# Patient Record
Sex: MALE | Race: WHITE | Employment: OTHER | URBAN - METROPOLITAN AREA
[De-identification: names, ages, dates, MRNs, and addresses within clinical notes are randomized per-mention and may not be internally consistent; named-entity substitution may affect disease eponyms.]

---

## 2019-06-26 ENCOUNTER — HOSPITAL ENCOUNTER (OUTPATIENT)
Dept: PHYSICAL THERAPY | Age: 44
Setting detail: THERAPIES SERIES
Discharge: HOME | End: 2019-06-26
Payer: MEDICAID

## 2020-06-24 ENCOUNTER — OFFICE VISIT (OUTPATIENT)
Dept: PRIMARY CARE CLINIC | Age: 45
End: 2020-06-24
Payer: MEDICAID

## 2020-06-24 PROCEDURE — 99211 OFF/OP EST MAY X REQ PHY/QHP: CPT | Performed by: NURSE PRACTITIONER

## 2020-06-24 RX ORDER — DIPHENHYDRAMINE HCL 25 MG
25 TABLET ORAL EVERY 6 HOURS PRN
COMMUNITY

## 2020-06-24 RX ORDER — CLONAZEPAM 1 MG/1
2 TABLET ORAL 2 TIMES DAILY
COMMUNITY

## 2020-06-24 RX ORDER — OXYCODONE HYDROCHLORIDE 15 MG/1
15 TABLET ORAL EVERY 8 HOURS
COMMUNITY

## 2020-06-24 RX ORDER — TIZANIDINE 4 MG/1
4 TABLET ORAL 4 TIMES DAILY
COMMUNITY

## 2020-06-24 RX ORDER — PREGABALIN 150 MG/1
150 CAPSULE ORAL 3 TIMES DAILY
COMMUNITY

## 2020-06-24 SDOH — HEALTH STABILITY: MENTAL HEALTH: HOW OFTEN DO YOU HAVE A DRINK CONTAINING ALCOHOL?: NEVER

## 2020-06-24 NOTE — PROGRESS NOTES
Obstructive Sleep Apnea (MAURI) Screening     Patient:  Lance Bello    YOB: 1975      Medical Record #:  [id number]                     Date:  6/24/2020     1. Are you a loud and/or regular snorer? []  Yes       [x] No    2. Have you been observed to gasp or stop breathing during sleep? []  Yes       [x] No    3. Do you feel tired or groggy upon awakening or do you awaken with a headache?           []  Yes       [x] No    4. Are you often tired or fatigued during the wake time hours? []  Yes       [x] No    5. Do you fall asleep sitting, reading, watching TV or driving? []  Yes       [x] No    6. Do you often have problems with memory or concentration? []  Yes       [x] No    **If patient's score is ? 3 they are considered high risk for MAURI. An Anesthesia provider will evaluate the patient and develop a plan of care the day of surgery. Note:  If the patient's BMI is more than 35 kg m¯² , has neck circumference > 40 cm, and/or high blood pressure the risk is greater (© American Sleep Apnea Association, 2006).

## 2020-06-27 LAB
SARS-COV-2: NOT DETECTED
SOURCE: NORMAL

## 2020-06-29 ENCOUNTER — ANESTHESIA EVENT (OUTPATIENT)
Dept: OPERATING ROOM | Age: 45
End: 2020-06-29
Payer: COMMERCIAL

## 2020-06-29 NOTE — ANESTHESIA PRE PROCEDURE
Department of Anesthesiology  Preprocedure Note       Name:  Johnie Lesch   Age:  41 y.o.  :  1975                                          MRN:  [id number]         Date:  2020      Surgeon: Trudy Ty): Coty Schumacher MD    Procedure: Procedure(s):  REPLACEMENT INTRATHECAL PUMP         FLOWONIX    Medications prior to admission:   Prior to Admission medications    Medication Sig Start Date End Date Taking? Authorizing Provider   pregabalin (LYRICA) 150 MG capsule Take 150 mg by mouth 3 times daily. Yes Historical Provider, MD   oxyCODONE (OXY-IR) 15 MG immediate release tablet Take 15 mg by mouth every 8 hours. Yes Historical Provider, MD   tiZANidine (ZANAFLEX) 4 MG tablet Take 4 mg by mouth 4 times daily TAKES 8 MG TWICE DAILY   Yes Historical Provider, MD   clonazePAM (KLONOPIN) 1 MG tablet Take 2 mg by mouth 2 times daily. Yes Historical Provider, MD   diphenhydrAMINE (BENADRYL) 25 MG tablet Take 25 mg by mouth every 6 hours as needed for Itching   Yes Historical Provider, MD       Current medications:    No current facility-administered medications for this encounter. Current Outpatient Medications   Medication Sig Dispense Refill    pregabalin (LYRICA) 150 MG capsule Take 150 mg by mouth 3 times daily.  oxyCODONE (OXY-IR) 15 MG immediate release tablet Take 15 mg by mouth every 8 hours.  tiZANidine (ZANAFLEX) 4 MG tablet Take 4 mg by mouth 4 times daily TAKES 8 MG TWICE DAILY      clonazePAM (KLONOPIN) 1 MG tablet Take 2 mg by mouth 2 times daily.  diphenhydrAMINE (BENADRYL) 25 MG tablet Take 25 mg by mouth every 6 hours as needed for Itching         Allergies: Allergies   Allergen Reactions    Augmentin [Amoxicillin-Pot Clavulanate] Rash       Problem List:  There is no problem list on file for this patient.       Past Medical History:        Diagnosis Date    Disc disorder 57       Past Surgical History:        Procedure Laterality Date    BACK SURGERY MULTPLE    BACK SURGERY      PAIN PUMP    ELBOW SURGERY Left     ULNAR NERVE TRANSPOSITION    ENDOSCOPY, COLON, DIAGNOSTIC      EYE SURGERY      LASIK    SPINAL CORD STIMULATOR SURGERY      REMOVED    SPINE SURGERY      HARDWARE AND SCAR TISSUE REMOVAL    TONSILLECTOMY      WISDOM TOOTH EXTRACTION         Social History:    Social History     Tobacco Use    Smoking status: Current Some Day Smoker     Types: Cigars    Smokeless tobacco: Current User     Types: Snuff   Substance Use Topics    Alcohol use: Never     Frequency: Never                                Ready to quit: Not Answered  Counseling given: Not Answered      Vital Signs (Current):   Vitals:    06/24/20 1618   Weight: 180 lb (81.6 kg)   Height: 5' 10\" (1.778 m)                                              BP Readings from Last 3 Encounters:   No data found for BP       NPO Status:                                                                                 BMI:   Wt Readings from Last 3 Encounters:   No data found for Wt     Body mass index is 25.83 kg/m². CBC:   Lab Results   Component Value Date    HGB 16.0 07/17/2013    HCT 48.4 07/17/2013       CMP:   Lab Results   Component Value Date    PROT 7.5 07/17/2013    BILITOT 0.40 07/17/2013    ALKPHOS 76 07/17/2013    AST 23 07/17/2013    ALT 19 07/17/2013       POC Tests: No results for input(s): POCGLU, POCNA, POCK, POCCL, POCBUN, POCHEMO, POCHCT in the last 72 hours.     Coags: No results found for: PROTIME, INR, APTT    HCG (If Applicable): No results found for: PREGTESTUR, PREGSERUM, HCG, HCGQUANT     ABGs: No results found for: PHART, PO2ART, RVX1FUS, OYT1HZZ, BEART, N5REJSST     Type & Screen (If Applicable):  No results found for: LABABO, LABRH    Drug/Infectious Status (If Applicable):  No results found for: HIV, HEPCAB    COVID-19 Screening (If Applicable):   Lab Results   Component Value Date    COVID19 Not Detected 06/24/2020         Anesthesia Evaluation  Patient summary reviewed and Nursing notes reviewed no history of anesthetic complications:   Airway: Mallampati: II     Neck ROM: full   Dental:          Pulmonary:Negative Pulmonary ROS and normal exam                               Cardiovascular:Negative CV ROS                      Neuro/Psych:   Negative Neuro/Psych ROS              GI/Hepatic/Renal: Neg GI/Hepatic/Renal ROS       (-) hiatal hernia and GERD       Endo/Other: Negative Endo/Other ROS                    Abdominal:           Vascular:                                        Anesthesia Plan      general     ASA 2     (I discussed with the patient the risks and benefits of PIV, general anesthesia, IV Narcotics, PACU. All questions were answered the patient agrees with the plan and wishes to proceed.  )  Induction: intravenous. Pre-Operative Diagnosis: Shunt malfunction, initial encounter [T85.618A]    41 y.o.   BMI:  Body mass index is 25.83 kg/m².      Vitals:    06/24/20 1618   Weight: 180 lb (81.6 kg)   Height: 5' 10\" (1.778 m)       Allergies   Allergen Reactions    Augmentin [Amoxicillin-Pot Clavulanate] Rash       Social History     Tobacco Use    Smoking status: Current Some Day Smoker     Types: Cigars    Smokeless tobacco: Current User     Types: Snuff   Substance Use Topics    Alcohol use: Never     Frequency: Never       LABS:    CBC  Lab Results   Component Value Date/Time    HGB 16.0 07/17/2013 08:53 AM    HCT 48.4 07/17/2013 08:53 AM     RENAL  No results found for: NA, K, CL, CO2, BUN, CREATININE, GLUCOSE  COAGS  No results found for: PROTIME, INR, APTT    Mellisa Nugent MD   6/29/2020

## 2020-06-30 ENCOUNTER — HOSPITAL ENCOUNTER (OUTPATIENT)
Dept: GENERAL RADIOLOGY | Age: 45
Discharge: HOME | End: 2020-06-30
Attending: NEUROLOGICAL SURGERY
Payer: COMMERCIAL

## 2020-06-30 ENCOUNTER — HOSPITAL ENCOUNTER (OUTPATIENT)
Age: 45
Setting detail: OUTPATIENT SURGERY
Discharge: HOME | End: 2020-06-30
Attending: NEUROLOGICAL SURGERY | Admitting: NEUROLOGICAL SURGERY
Payer: COMMERCIAL

## 2020-06-30 ENCOUNTER — ANESTHESIA (OUTPATIENT)
Dept: OPERATING ROOM | Age: 45
End: 2020-06-30
Payer: COMMERCIAL

## 2020-06-30 VITALS
SYSTOLIC BLOOD PRESSURE: 82 MMHG | OXYGEN SATURATION: 100 % | DIASTOLIC BLOOD PRESSURE: 56 MMHG | TEMPERATURE: 96.8 F | RESPIRATION RATE: 7 BRPM

## 2020-06-30 VITALS
BODY MASS INDEX: 25.48 KG/M2 | RESPIRATION RATE: 12 BRPM | WEIGHT: 178 LBS | HEART RATE: 65 BPM | SYSTOLIC BLOOD PRESSURE: 135 MMHG | OXYGEN SATURATION: 97 % | DIASTOLIC BLOOD PRESSURE: 74 MMHG | TEMPERATURE: 96.9 F | HEIGHT: 70 IN

## 2020-06-30 PROCEDURE — 3700000000 HC ANESTHESIA ATTENDED CARE: Performed by: NEUROLOGICAL SURGERY

## 2020-06-30 PROCEDURE — 2709999900 HC NON-CHARGEABLE SUPPLY: Performed by: NEUROLOGICAL SURGERY

## 2020-06-30 PROCEDURE — E0783 PROGRAMMABLE INFUSION PUMP: HCPCS | Performed by: NEUROLOGICAL SURGERY

## 2020-06-30 PROCEDURE — 6360000002 HC RX W HCPCS: Performed by: NEUROLOGICAL SURGERY

## 2020-06-30 PROCEDURE — 6360000002 HC RX W HCPCS: Performed by: NURSE ANESTHETIST, CERTIFIED REGISTERED

## 2020-06-30 PROCEDURE — 2500000003 HC RX 250 WO HCPCS: Performed by: NURSE ANESTHETIST, CERTIFIED REGISTERED

## 2020-06-30 PROCEDURE — 2500000003 HC RX 250 WO HCPCS: Performed by: NEUROLOGICAL SURGERY

## 2020-06-30 PROCEDURE — 2580000003 HC RX 258: Performed by: ANESTHESIOLOGY

## 2020-06-30 PROCEDURE — 7100000000 HC PACU RECOVERY - FIRST 15 MIN: Performed by: NEUROLOGICAL SURGERY

## 2020-06-30 PROCEDURE — 2580000003 HC RX 258: Performed by: NEUROLOGICAL SURGERY

## 2020-06-30 PROCEDURE — 7100000010 HC PHASE II RECOVERY - FIRST 15 MIN: Performed by: NEUROLOGICAL SURGERY

## 2020-06-30 PROCEDURE — 3700000001 HC ADD 15 MINUTES (ANESTHESIA): Performed by: NEUROLOGICAL SURGERY

## 2020-06-30 PROCEDURE — 2720000010 HC SURG SUPPLY STERILE: Performed by: NEUROLOGICAL SURGERY

## 2020-06-30 PROCEDURE — 3600000004 HC SURGERY LEVEL 4 BASE: Performed by: NEUROLOGICAL SURGERY

## 2020-06-30 PROCEDURE — 3600000014 HC SURGERY LEVEL 4 ADDTL 15MIN: Performed by: NEUROLOGICAL SURGERY

## 2020-06-30 PROCEDURE — 7100000001 HC PACU RECOVERY - ADDTL 15 MIN: Performed by: NEUROLOGICAL SURGERY

## 2020-06-30 PROCEDURE — 7100000011 HC PHASE II RECOVERY - ADDTL 15 MIN: Performed by: NEUROLOGICAL SURGERY

## 2020-06-30 DEVICE — THE PROMETRA II PROGRAMMABLE PUMP IS A STERILE, BATTERY-OPERATED, TEARDROP-SHAPED IMPLANTABLE, PROGRAMMABLE INFUSION PUMP, WITH A RIGID TITANIUM HOUSING AND FLOW CONTROLLER SYSTEM, WHICH DISPENSES INFUSATE INTO THE INTRATHECAL SPACE THROUGH AN IMPLANTED INTRATHECAL CATHETER. TO HELP INCREASE SAFETY, THE PROMETRA II PUMP INCORPORATES A SAFETY VALVE (FLOW-ACTIVATED VALVE OR FAV) THAT WILL SHUT OFF DRUG FLOW TO THE PATIENT IN THE EVENT A HIGH FLOW RATE OCCURS, SUCH AS DURING AN MRI. ALL FUNCTIONS OF THE SYSTEM (E.G., DOSING) ARE CONTROLLED EXTERNALLY USING A HAND-HELD, BATTERY-OPERATED PROGRAMMER. THE PROMETRA II PUMP CONTAINS A METAL BELLOWS DRUG RESERVOIR WITH A CAPACITY OF 20 MILLILITERS (ML). THE RESERVOIR PROPELLANT IS STORED WITHIN THE RIGID HOUSING SURROUNDING THE BELLOWS AND PROVIDES THE DRIVING PRESSURE FOR THE PUMP. THE DRIVING PRESSURE ON THE RESERVOIR FORCES THE INFUSATE THROUGH AN OUTLET FILTER (0.22 PM), AND INTO AN ELECTRONICALLY CONTROLLED FLOW METERING VALVE-ACCUMULATOR SUBSYSTEM. THE INFUMORPH PASSES FROM THE FLOW METERING SUBSYSTEM, INTO THE CATHETER ACCESS PORT THEN INTO THE CATHETER FOR DELIVERY TO THE INTRATHECAL SPACE.
Type: IMPLANTABLE DEVICE | Status: FUNCTIONAL
Brand: PROMETRA II PUMP

## 2020-06-30 DEVICE — THE CATHETER REVISION KIT IS A STERILE KIT DESIGNED TO FACILITATE THE REPLACEMENT OF A SECTION OF THE INTRATHECAL CATHETER.  A REPLACEMENT CATHETER SEGMENT IS PROVIDED WITH A CATHETER CONNECTOR (AND SLEEVE) TO ALLOW THIS SEGMENT TO BE ATTACHED TO THE EXISTING CATHETER.
Type: IMPLANTABLE DEVICE | Status: FUNCTIONAL
Brand: PROMETRA CATHETER REVISION KIT

## 2020-06-30 RX ORDER — MIDAZOLAM HYDROCHLORIDE 1 MG/ML
INJECTION INTRAMUSCULAR; INTRAVENOUS PRN
Status: DISCONTINUED | OUTPATIENT
Start: 2020-06-30 | End: 2020-06-30 | Stop reason: SDUPTHER

## 2020-06-30 RX ORDER — DIPHENHYDRAMINE HYDROCHLORIDE 50 MG/ML
12.5 INJECTION INTRAMUSCULAR; INTRAVENOUS
Status: DISCONTINUED | OUTPATIENT
Start: 2020-06-30 | End: 2020-06-30 | Stop reason: HOSPADM

## 2020-06-30 RX ORDER — SODIUM CHLORIDE, SODIUM LACTATE, POTASSIUM CHLORIDE, CALCIUM CHLORIDE 600; 310; 30; 20 MG/100ML; MG/100ML; MG/100ML; MG/100ML
INJECTION, SOLUTION INTRAVENOUS CONTINUOUS
Status: DISCONTINUED | OUTPATIENT
Start: 2020-06-30 | End: 2020-06-30 | Stop reason: HOSPADM

## 2020-06-30 RX ORDER — PROPOFOL 10 MG/ML
INJECTION, EMULSION INTRAVENOUS PRN
Status: DISCONTINUED | OUTPATIENT
Start: 2020-06-30 | End: 2020-06-30 | Stop reason: SDUPTHER

## 2020-06-30 RX ORDER — VANCOMYCIN HYDROCHLORIDE 1 G/20ML
INJECTION, POWDER, LYOPHILIZED, FOR SOLUTION INTRAVENOUS PRN
Status: DISCONTINUED | OUTPATIENT
Start: 2020-06-30 | End: 2020-06-30 | Stop reason: ALTCHOICE

## 2020-06-30 RX ORDER — ONDANSETRON 2 MG/ML
4 INJECTION INTRAMUSCULAR; INTRAVENOUS PRN
Status: DISCONTINUED | OUTPATIENT
Start: 2020-06-30 | End: 2020-06-30 | Stop reason: HOSPADM

## 2020-06-30 RX ORDER — ROCURONIUM BROMIDE 10 MG/ML
INJECTION, SOLUTION INTRAVENOUS PRN
Status: DISCONTINUED | OUTPATIENT
Start: 2020-06-30 | End: 2020-06-30 | Stop reason: SDUPTHER

## 2020-06-30 RX ORDER — LIDOCAINE HYDROCHLORIDE 10 MG/ML
2 INJECTION, SOLUTION INFILTRATION; PERINEURAL
Status: DISCONTINUED | OUTPATIENT
Start: 2020-06-30 | End: 2020-06-30 | Stop reason: HOSPADM

## 2020-06-30 RX ORDER — MORPHINE SULFATE 2 MG/ML
2 INJECTION, SOLUTION INTRAMUSCULAR; INTRAVENOUS EVERY 5 MIN PRN
Status: DISCONTINUED | OUTPATIENT
Start: 2020-06-30 | End: 2020-06-30 | Stop reason: HOSPADM

## 2020-06-30 RX ORDER — OXYCODONE HYDROCHLORIDE AND ACETAMINOPHEN 5; 325 MG/1; MG/1
1 TABLET ORAL PRN
Status: DISCONTINUED | OUTPATIENT
Start: 2020-06-30 | End: 2020-06-30 | Stop reason: HOSPADM

## 2020-06-30 RX ORDER — MORPHINE SULFATE 2 MG/ML
1 INJECTION, SOLUTION INTRAMUSCULAR; INTRAVENOUS EVERY 5 MIN PRN
Status: DISCONTINUED | OUTPATIENT
Start: 2020-06-30 | End: 2020-06-30 | Stop reason: HOSPADM

## 2020-06-30 RX ORDER — OXYCODONE HYDROCHLORIDE AND ACETAMINOPHEN 5; 325 MG/1; MG/1
2 TABLET ORAL PRN
Status: DISCONTINUED | OUTPATIENT
Start: 2020-06-30 | End: 2020-06-30 | Stop reason: HOSPADM

## 2020-06-30 RX ORDER — PROMETHAZINE HYDROCHLORIDE 25 MG/ML
6.25 INJECTION, SOLUTION INTRAMUSCULAR; INTRAVENOUS
Status: DISCONTINUED | OUTPATIENT
Start: 2020-06-30 | End: 2020-06-30 | Stop reason: HOSPADM

## 2020-06-30 RX ORDER — LIDOCAINE HYDROCHLORIDE AND EPINEPHRINE 10; 10 MG/ML; UG/ML
INJECTION, SOLUTION INFILTRATION; PERINEURAL PRN
Status: DISCONTINUED | OUTPATIENT
Start: 2020-06-30 | End: 2020-06-30 | Stop reason: ALTCHOICE

## 2020-06-30 RX ORDER — LIDOCAINE HYDROCHLORIDE 20 MG/ML
INJECTION, SOLUTION INFILTRATION; PERINEURAL PRN
Status: DISCONTINUED | OUTPATIENT
Start: 2020-06-30 | End: 2020-06-30 | Stop reason: SDUPTHER

## 2020-06-30 RX ORDER — ONDANSETRON 2 MG/ML
INJECTION INTRAMUSCULAR; INTRAVENOUS PRN
Status: DISCONTINUED | OUTPATIENT
Start: 2020-06-30 | End: 2020-06-30 | Stop reason: SDUPTHER

## 2020-06-30 RX ORDER — HYDRALAZINE HYDROCHLORIDE 20 MG/ML
5 INJECTION INTRAMUSCULAR; INTRAVENOUS EVERY 10 MIN PRN
Status: DISCONTINUED | OUTPATIENT
Start: 2020-06-30 | End: 2020-06-30 | Stop reason: HOSPADM

## 2020-06-30 RX ORDER — LABETALOL HYDROCHLORIDE 5 MG/ML
5 INJECTION, SOLUTION INTRAVENOUS EVERY 10 MIN PRN
Status: DISCONTINUED | OUTPATIENT
Start: 2020-06-30 | End: 2020-06-30 | Stop reason: HOSPADM

## 2020-06-30 RX ORDER — FENTANYL CITRATE 50 UG/ML
INJECTION, SOLUTION INTRAMUSCULAR; INTRAVENOUS PRN
Status: DISCONTINUED | OUTPATIENT
Start: 2020-06-30 | End: 2020-06-30 | Stop reason: SDUPTHER

## 2020-06-30 RX ORDER — BUPIVACAINE HYDROCHLORIDE 5 MG/ML
INJECTION, SOLUTION EPIDURAL; INTRACAUDAL PRN
Status: DISCONTINUED | OUTPATIENT
Start: 2020-06-30 | End: 2020-06-30 | Stop reason: ALTCHOICE

## 2020-06-30 RX ORDER — GLYCOPYRROLATE 0.2 MG/ML
INJECTION INTRAMUSCULAR; INTRAVENOUS PRN
Status: DISCONTINUED | OUTPATIENT
Start: 2020-06-30 | End: 2020-06-30 | Stop reason: SDUPTHER

## 2020-06-30 RX ORDER — DEXAMETHASONE SODIUM PHOSPHATE 4 MG/ML
INJECTION, SOLUTION INTRA-ARTICULAR; INTRALESIONAL; INTRAMUSCULAR; INTRAVENOUS; SOFT TISSUE PRN
Status: DISCONTINUED | OUTPATIENT
Start: 2020-06-30 | End: 2020-06-30 | Stop reason: SDUPTHER

## 2020-06-30 RX ADMIN — PROPOFOL 160 MG: 10 INJECTION, EMULSION INTRAVENOUS at 12:52

## 2020-06-30 RX ADMIN — SODIUM CHLORIDE, POTASSIUM CHLORIDE, SODIUM LACTATE AND CALCIUM CHLORIDE: 600; 310; 30; 20 INJECTION, SOLUTION INTRAVENOUS at 12:35

## 2020-06-30 RX ADMIN — DEXAMETHASONE SODIUM PHOSPHATE 10 MG: 4 INJECTION, SOLUTION INTRAMUSCULAR; INTRAVENOUS at 13:05

## 2020-06-30 RX ADMIN — SUGAMMADEX 200 MG: 100 INJECTION, SOLUTION INTRAVENOUS at 13:42

## 2020-06-30 RX ADMIN — LIDOCAINE HYDROCHLORIDE 80 MG: 20 INJECTION, SOLUTION INFILTRATION; PERINEURAL at 12:52

## 2020-06-30 RX ADMIN — GLYCOPYRROLATE 0.2 MG: 0.2 INJECTION, SOLUTION INTRAMUSCULAR; INTRAVENOUS at 13:22

## 2020-06-30 RX ADMIN — ROCURONIUM BROMIDE 50 MG: 10 SOLUTION INTRAVENOUS at 12:52

## 2020-06-30 RX ADMIN — FENTANYL CITRATE 100 MCG: 50 INJECTION INTRAMUSCULAR; INTRAVENOUS at 12:50

## 2020-06-30 RX ADMIN — ONDANSETRON 4 MG: 2 INJECTION INTRAMUSCULAR; INTRAVENOUS at 13:04

## 2020-06-30 RX ADMIN — MIDAZOLAM HYDROCHLORIDE 2 MG: 2 INJECTION, SOLUTION INTRAMUSCULAR; INTRAVENOUS at 12:48

## 2020-06-30 ASSESSMENT — PAIN DESCRIPTION - PROGRESSION: CLINICAL_PROGRESSION: OTHER (COMMENT)

## 2020-06-30 ASSESSMENT — PULMONARY FUNCTION TESTS
PIF_VALUE: 15
PIF_VALUE: 16
PIF_VALUE: 14
PIF_VALUE: 16
PIF_VALUE: 15
PIF_VALUE: 16
PIF_VALUE: 1
PIF_VALUE: 1
PIF_VALUE: 0
PIF_VALUE: 15
PIF_VALUE: 0
PIF_VALUE: 15
PIF_VALUE: 16
PIF_VALUE: 14
PIF_VALUE: 15
PIF_VALUE: 16
PIF_VALUE: 15
PIF_VALUE: 16
PIF_VALUE: 17
PIF_VALUE: 16
PIF_VALUE: 17
PIF_VALUE: 17
PIF_VALUE: 15
PIF_VALUE: 15
PIF_VALUE: 16
PIF_VALUE: 15
PIF_VALUE: 16
PIF_VALUE: 16
PIF_VALUE: 6
PIF_VALUE: 1
PIF_VALUE: 13
PIF_VALUE: 14
PIF_VALUE: 16
PIF_VALUE: 14
PIF_VALUE: 2
PIF_VALUE: 15
PIF_VALUE: 17
PIF_VALUE: 3
PIF_VALUE: 15
PIF_VALUE: 16
PIF_VALUE: 16
PIF_VALUE: 15
PIF_VALUE: 16
PIF_VALUE: 14
PIF_VALUE: 17
PIF_VALUE: 16
PIF_VALUE: 15
PIF_VALUE: 14
PIF_VALUE: 16
PIF_VALUE: 17
PIF_VALUE: 15
PIF_VALUE: 16
PIF_VALUE: 16
PIF_VALUE: 0
PIF_VALUE: 15
PIF_VALUE: 16
PIF_VALUE: 17
PIF_VALUE: 14

## 2020-06-30 ASSESSMENT — PAIN DESCRIPTION - FREQUENCY
FREQUENCY: CONTINUOUS
FREQUENCY: CONTINUOUS

## 2020-06-30 ASSESSMENT — PAIN DESCRIPTION - ORIENTATION
ORIENTATION: RIGHT;LEFT
ORIENTATION: RIGHT;LEFT

## 2020-06-30 ASSESSMENT — PAIN SCALES - GENERAL
PAINLEVEL_OUTOF10: 9
PAINLEVEL_OUTOF10: 8

## 2020-06-30 ASSESSMENT — PAIN DESCRIPTION - LOCATION
LOCATION: LEG
LOCATION: LEG
LOCATION: BACK;LEG;ARM;NECK

## 2020-06-30 ASSESSMENT — PAIN DESCRIPTION - PAIN TYPE
TYPE: OTHER (COMMENT)
TYPE: CHRONIC PAIN

## 2020-06-30 ASSESSMENT — PAIN DESCRIPTION - ONSET: ONSET: OTHER (COMMENT)

## 2020-06-30 ASSESSMENT — PAIN DESCRIPTION - DESCRIPTORS: DESCRIPTORS: ACHING

## 2020-06-30 NOTE — PROGRESS NOTES
Pt tolerating light snack and sips of water. Report given to derek rn. Pt states pain is 8 out of 10 and states that is tolerable.

## 2020-06-30 NOTE — ANESTHESIA POSTPROCEDURE EVALUATION
Department of Anesthesiology  Postprocedure Note    Patient: Enrico Solis  MRN: [id number]  YOB: 1975  Date of evaluation: 6/30/2020  Time:  6:13 PM     Procedure Summary     Date:  06/30/20 Room / Location:  [facility]    Anesthesia Start:  7188 Anesthesia Stop:  1400    Procedure:  REPLACEMENT INTRATHECAL PUMP         FLOWONIX (N/A ) Diagnosis:       Shunt malfunction, initial encounter      (BREAKDOWN IMPLANTS)    Surgeon: Syde Sanchez MD Responsible Provider:  Tony Keller MD    Anesthesia Type:  general ASA Status:  2          Anesthesia Type: general    Ed Phase I: Ed Score: 10    Ed Phase II:      Last vitals: Reviewed and per EMR flowsheets. Anesthesia Post Evaluation    Comments: Postoperative Anesthesia Note    Name:    Enrico Solis  MRN:      [id number]    Patient Vitals in the past 12 hrs:  06/30/20 1452, Pulse:65  06/30/20 1435, BP:135/74, Temp:96.9 °F (36.1 °C), Temp src:Temporal, Pulse:67, Resp:12, SpO2:97 %  06/30/20 1358, BP:119/79, Temp:97.8 °F (36.6 °C), Temp src:Temporal, Pulse:66, Resp:10, SpO2:97 %  06/30/20 1045, BP:(!) 144/80, Temp:98.4 °F (36.9 °C), Temp src:Temporal, Pulse:74, Resp:20, SpO2:95 %, Height:5' 10\" (1.778 m), Weight:178 lb (80.7 kg)     LABS:    CBC  Lab Results       Component                Value               Date/Time                  HGB                      16.0                07/17/2013 08:53 AM        HCT                      48.4                07/17/2013 08:53 AM   RENAL  No results found for: NA, K, CL, CO2, BUN, CREATININE, GLUCOSE  COAGS  No results found for: PROTIME, INR, APTT    Intake & Output:  @97JMWE@    Nausea & Vomiting:  No    Level of Consciousness:  Awake    Pain Assessment:  Adequate analgesia    Anesthesia Complications:  No apparent anesthetic complications    SUMMARY      Vital signs stable  OK to discharge from Stage I post anesthesia care.   Care transferred from Anesthesiology

## 2020-06-30 NOTE — BRIEF OP NOTE
Brief Postoperative Note      Patient: Alexx Mackenzie  YOB: 1975  MRN: [id number]    Date of Procedure: 6/30/2020    Pre-Op Diagnosis: BREAKDOWN IMPLANTS    Post-Op Diagnosis: Same       Procedure(s):  REPLACEMENT INTRATHECAL PUMP         FLOWONIX    Surgeon(s): Jeff Holt MD    Assistant:  Surgical Assistant: Anne Marie Arnold    Anesthesia: General    Estimated Blood Loss (mL): Minimal    Complications: None    Specimens:   * No specimens in log *    Implants:  Implant Name Type Inv.  Item Serial No.  Lot No. LRB No. Used Action   IMPL PUMP PAIN PROGRAM PROMETRA II - [id number] Spine IMPL PUMP PAIN PROGRAM Kika Johnston [id number] [facility] [id number] N/A 1 Implanted   KIT REVISION CATHETER Port KIT REVISION CATHETER  [facility] [id number] N/A 1 Implanted         Drains: * No LDAs found *    Findings: Patent catheter    Electronically signed by Christine Alanis MD on 6/30/2020 at 1:55 PM

## 2020-07-01 NOTE — OP NOTE
was slightly enlarged. Four Ethibond sutures  were placed through the four corners. The pump was then fixed to the  catheter and implanted back into the abdomen. The Ethibond sutures were  passed through the islets and tied, securing the pump firmly to the  anterior abdominal wall. Careful attention was made not to trap the  catheter within the sutures. We then aspirated the site port confirming  patency of the system again. The wound was then irrigated with  antibiotic saline. Vancomycin was placed in the incision, which was  then closed using deep 3-0 Vicryls, followed by running 4-0 Monocryl,  Prineo dressing, surgical dressing, followed by a binder. The patient  was returned to the supine position, extubated by the Anesthesia  service, and transferred to the PACU. His pump was set to run at 1500  mcg a day of fentanyl. All counts were correct and I was present for  the entirety of the procedure.         Lori Yu MD    D: 06/30/2020 14:00:48       T: 06/30/2020 23:20:47     [id number]  Job#: [id number]     Doc#: [id number]    CC:

## 2020-08-06 NOTE — H&P
Day of surgery I reviewed his H&P dated 6/26/2020. Patient examined and no changes noted. 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 Plan to proceed with intrathecal pump replacement. Surgery and consent reviewed with patient. No further questions.

## 2020-09-21 ENCOUNTER — HOSPITAL ENCOUNTER (OUTPATIENT)
Dept: GENERAL RADIOLOGY | Age: 45
Discharge: HOME | End: 2020-09-21
Payer: COMMERCIAL

## 2020-09-21 ENCOUNTER — HOSPITAL ENCOUNTER (OUTPATIENT)
Age: 45
Discharge: HOME | End: 2020-09-21
Payer: COMMERCIAL

## 2020-09-21 PROCEDURE — 71046 X-RAY EXAM CHEST 2 VIEWS: CPT

## 2021-04-27 ENCOUNTER — HOSPITAL ENCOUNTER (OUTPATIENT)
Dept: MRI IMAGING | Age: 46
Discharge: HOME | End: 2021-04-27
Payer: COMMERCIAL

## 2021-04-27 DIAGNOSIS — M25.512 LEFT SHOULDER PAIN, UNSPECIFIED CHRONICITY: ICD-10-CM

## 2021-04-27 PROCEDURE — 73221 MRI JOINT UPR EXTREM W/O DYE: CPT

## 2021-11-09 ENCOUNTER — HOSPITAL ENCOUNTER (OUTPATIENT)
Dept: MRI IMAGING | Age: 46
Discharge: HOME | End: 2021-11-09
Payer: COMMERCIAL

## 2021-11-09 DIAGNOSIS — M54.12 CERVICAL RADICULITIS: ICD-10-CM

## 2021-11-09 PROCEDURE — 72141 MRI NECK SPINE W/O DYE: CPT

## 2022-12-03 ENCOUNTER — APPOINTMENT (OUTPATIENT)
Dept: GENERAL RADIOLOGY | Age: 47
End: 2022-12-03
Payer: COMMERCIAL

## 2022-12-03 ENCOUNTER — HOSPITAL ENCOUNTER (EMERGENCY)
Age: 47
Discharge: HOME | End: 2022-12-03
Payer: COMMERCIAL

## 2022-12-03 VITALS
HEIGHT: 70 IN | SYSTOLIC BLOOD PRESSURE: 94 MMHG | DIASTOLIC BLOOD PRESSURE: 68 MMHG | WEIGHT: 185 LBS | HEART RATE: 94 BPM | TEMPERATURE: 98.3 F | OXYGEN SATURATION: 95 % | RESPIRATION RATE: 14 BRPM | BODY MASS INDEX: 26.48 KG/M2

## 2022-12-03 DIAGNOSIS — J06.9 ACUTE UPPER RESPIRATORY INFECTION: Primary | ICD-10-CM

## 2022-12-03 DIAGNOSIS — J10.1 INFLUENZA A: ICD-10-CM

## 2022-12-03 LAB
A/G RATIO: 1.5 (ref 1.1–2.2)
ALBUMIN SERPL-MCNC: 4 G/DL (ref 3.4–5)
ALP BLD-CCNC: 69 U/L (ref 40–129)
ALT SERPL-CCNC: 15 U/L (ref 10–40)
ANION GAP SERPL CALCULATED.3IONS-SCNC: 11 MMOL/L (ref 3–16)
AST SERPL-CCNC: 29 U/L (ref 15–37)
BASOPHILS ABSOLUTE: 0.1 K/UL (ref 0–0.2)
BASOPHILS RELATIVE PERCENT: 1.8 %
BILIRUB SERPL-MCNC: 0.6 MG/DL (ref 0–1)
BUN BLDV-MCNC: 10 MG/DL (ref 7–20)
CALCIUM SERPL-MCNC: 9.1 MG/DL (ref 8.3–10.6)
CHLORIDE BLD-SCNC: 94 MMOL/L (ref 99–110)
CO2: 27 MMOL/L (ref 21–32)
CREAT SERPL-MCNC: 0.8 MG/DL (ref 0.9–1.3)
EOSINOPHILS ABSOLUTE: 0.1 K/UL (ref 0–0.6)
EOSINOPHILS RELATIVE PERCENT: 1.1 %
GFR SERPL CREATININE-BSD FRML MDRD: >60 ML/MIN/{1.73_M2}
GLUCOSE BLD-MCNC: 75 MG/DL (ref 70–99)
HCT VFR BLD CALC: 46.5 % (ref 40.5–52.5)
HEMOGLOBIN: 15.6 G/DL (ref 13.5–17.5)
LACTIC ACID, SEPSIS: 1.6 MMOL/L (ref 0.4–1.9)
LYMPHOCYTES ABSOLUTE: 0.9 K/UL (ref 1–5.1)
LYMPHOCYTES RELATIVE PERCENT: 18 %
MCH RBC QN AUTO: 28.2 PG (ref 26–34)
MCHC RBC AUTO-ENTMCNC: 33.5 G/DL (ref 31–36)
MCV RBC AUTO: 84.2 FL (ref 80–100)
MONOCYTES ABSOLUTE: 0.3 K/UL (ref 0–1.3)
MONOCYTES RELATIVE PERCENT: 6.7 %
NEUTROPHILS ABSOLUTE: 3.7 K/UL (ref 1.7–7.7)
NEUTROPHILS RELATIVE PERCENT: 72.4 %
PDW BLD-RTO: 12.9 % (ref 12.4–15.4)
PLATELET # BLD: 117 K/UL (ref 135–450)
PMV BLD AUTO: 8.1 FL (ref 5–10.5)
POTASSIUM REFLEX MAGNESIUM: 4.4 MMOL/L (ref 3.5–5.1)
RAPID INFLUENZA  B AGN: NEGATIVE
RAPID INFLUENZA A AGN: POSITIVE
RBC # BLD: 5.52 M/UL (ref 4.2–5.9)
SARS-COV-2, NAAT: NOT DETECTED
SODIUM BLD-SCNC: 132 MMOL/L (ref 136–145)
TOTAL PROTEIN: 6.7 G/DL (ref 6.4–8.2)
WBC # BLD: 5.1 K/UL (ref 4–11)

## 2022-12-03 PROCEDURE — 71046 X-RAY EXAM CHEST 2 VIEWS: CPT

## 2022-12-03 PROCEDURE — 87804 INFLUENZA ASSAY W/OPTIC: CPT

## 2022-12-03 PROCEDURE — 80053 COMPREHEN METABOLIC PANEL: CPT

## 2022-12-03 PROCEDURE — 87635 SARS-COV-2 COVID-19 AMP PRB: CPT

## 2022-12-03 PROCEDURE — 85025 COMPLETE CBC W/AUTO DIFF WBC: CPT

## 2022-12-03 PROCEDURE — 99284 EMERGENCY DEPT VISIT MOD MDM: CPT

## 2022-12-03 PROCEDURE — 6360000002 HC RX W HCPCS: Performed by: PHYSICIAN ASSISTANT

## 2022-12-03 PROCEDURE — 36415 COLL VENOUS BLD VENIPUNCTURE: CPT

## 2022-12-03 PROCEDURE — 83605 ASSAY OF LACTIC ACID: CPT

## 2022-12-03 RX ORDER — DEXAMETHASONE 6 MG/1
12 TABLET ORAL ONCE
Qty: 2 TABLET | Refills: 0 | Status: SHIPPED | OUTPATIENT
Start: 2022-12-03 | End: 2022-12-03

## 2022-12-03 RX ORDER — DEXAMETHASONE 4 MG/1
12 TABLET ORAL ONCE
Status: COMPLETED | OUTPATIENT
Start: 2022-12-03 | End: 2022-12-03

## 2022-12-03 RX ADMIN — DEXAMETHASONE 12 MG: 4 TABLET ORAL at 17:01

## 2022-12-03 ASSESSMENT — LIFESTYLE VARIABLES
HOW OFTEN DO YOU HAVE A DRINK CONTAINING ALCOHOL: NEVER
HOW MANY STANDARD DRINKS CONTAINING ALCOHOL DO YOU HAVE ON A TYPICAL DAY: PATIENT DOES NOT DRINK

## 2022-12-03 ASSESSMENT — PAIN - FUNCTIONAL ASSESSMENT: PAIN_FUNCTIONAL_ASSESSMENT: NONE - DENIES PAIN

## 2022-12-03 NOTE — ED PROVIDER NOTES
[facility]        Pt Name: Risa Becerra  MRN: [id number]  [place] 1975  Date of evaluation: 12/3/2022  Provider: Efrain Leslie PA-C  PCP: Gisella Washington MD  Note Started: 4:03 PM EST 12/3/22          BABS. I have evaluated this patient. My supervising physician was available for consultation. CHIEF COMPLAINT       Chief Complaint   Patient presents with    Fever     Pt w c/o fever from Tuesday that was controlled by Tylenol. Current T 98.3 F       HISTORY OF PRESENT ILLNESS   (Location, Timing/Onset, Context/Setting, Quality, Duration, Modifying Factors, Severity, Associated Signs and Symptoms)  Note limiting factors. Chief Complaint: Cough, fever, general weakness    Risa Becerra is a 56 y.o. male who presents to the emergency department the chief complaint of a 4-day history of cough, fever, general weakness, body aches and chills. Patient's had fevers as high as 103 two days ago. He denies chest pain, shortness breath, abdominal pain, urinary or bowel symptoms or other symptoms. Denies any known recent sick contacts. States he has had his flu and COVID shots. Denies any other symptoms. Patient states he does smoke and has possible baseline history of asthma or COPD as he has inhalers at home. Nursing Notes were all reviewed and agreed with or any disagreements were addressed in the HPI. REVIEW OF SYSTEMS    (2-9 systems for level 4, 10 or more for level 5)     Review of Systems    Positives and Pertinent negatives as per HPI. Except as noted above in the ROS, all other systems were reviewed and negative.        PAST MEDICAL HISTORY     Past Medical History:   Diagnosis Date    Disc disorder 1996         SURGICAL HISTORY     Past Surgical History:   Procedure Laterality Date    BACK SURGERY      MULTPLE    BACK SURGERY      PAIN PUMP    ELBOW SURGERY Left     ULNAR NERVE TRANSPOSITION ENDOSCOPY, COLON, DIAGNOSTIC      EYE SURGERY      LASIK    SPINAL CORD STIMULATOR SURGERY      REMOVED    SPINE SURGERY      HARDWARE AND SCAR TISSUE REMOVAL    STIMULATOR SURGERY N/A 6/30/2020    REPLACEMENT INTRATHECAL PUMP         FLOWONIX performed by Florencio Rangel MD at [place] [address]       Previous Medications    CLONAZEPAM (KLONOPIN) 1 MG TABLET    Take 2 mg by mouth 2 times daily. DIPHENHYDRAMINE (BENADRYL) 25 MG TABLET    Take 25 mg by mouth every 6 hours as needed for Itching    OXYCODONE (OXY-IR) 15 MG IMMEDIATE RELEASE TABLET    Take 15 mg by mouth every 8 hours. PREGABALIN (LYRICA) 150 MG CAPSULE    Take 150 mg by mouth 3 times daily. TIZANIDINE (ZANAFLEX) 4 MG TABLET    Take 4 mg by mouth 4 times daily TAKES 8 MG TWICE DAILY         ALLERGIES     Augmentin [amoxicillin-pot clavulanate]    FAMILYHISTORY     History reviewed. No pertinent family history. SOCIAL HISTORY       Social History     Tobacco Use    Smoking status: Some Days     Types: Cigars    Smokeless tobacco: Current     Types: Snuff   Vaping Use    Vaping Use: Former   Substance Use Topics    Alcohol use: Never    Drug use: Never     Types: Marijuana (Weed)     Comment: MEDICAL TRIAL       SCREENINGS    [place] Coma Scale  Eye Opening: Spontaneous  Best Verbal Response: Oriented  Best Motor Response: Obeys commands  [place] Coma Scale Score: 15        PHYSICAL EXAM    (up to 7 for level 4, 8 or more for level 5)     ED Triage Vitals [12/03/22 1539]   BP Temp Temp Source Heart Rate Resp SpO2 Height Weight   94/68 98.3 °F (36.8 °C) Oral 94 14 95 % 5' 10\" (1.778 m) 185 lb (83.9 kg)       Physical Exam  Vitals and nursing note reviewed. Constitutional:       Appearance: He is well-developed. He is not diaphoretic. HENT:      Head: Atraumatic. Nose: Nose normal.   Eyes:      General:         Right eye: No discharge. Left eye: No discharge. Cardiovascular:      Rate and Rhythm: Normal rate and regular rhythm. Heart sounds: No murmur heard. No friction rub. No gallop. Pulmonary:      Effort: Pulmonary effort is normal. No respiratory distress. Breath sounds: No stridor. Wheezing present. No rhonchi or rales. Comments: Very mild expiratory wheezing with forced expiration. No retractions or accessory muscle use. Abdominal:      General: Bowel sounds are normal. There is no distension. Palpations: Abdomen is soft. There is no mass. Tenderness: There is no abdominal tenderness. There is no guarding or rebound. Hernia: No hernia is present. Musculoskeletal:         General: No swelling. Normal range of motion. Cervical back: Normal range of motion. Skin:     General: Skin is warm and dry. Findings: No erythema or rash. Neurological:      Mental Status: He is alert and oriented to person, place, and time. Cranial Nerves: No cranial nerve deficit. Psychiatric:         Behavior: Behavior normal.       DIAGNOSTIC RESULTS   LABS:    Labs Reviewed   RAPID INFLUENZA A/B ANTIGENS - Abnormal; Notable for the following components:       Result Value    Rapid Influenza A Ag POSITIVE (*)     All other components within normal limits   CBC WITH AUTO DIFFERENTIAL - Abnormal; Notable for the following components:    Platelets 094 (*)     Lymphocytes Absolute 0.9 (*)     All other components within normal limits   COMPREHENSIVE METABOLIC PANEL W/ REFLEX TO MG FOR LOW K - Abnormal; Notable for the following components:    Sodium 132 (*)     Chloride 94 (*)     Creatinine 0.8 (*)     All other components within normal limits   COVID-19, RAPID   LACTATE, SEPSIS   LACTATE, SEPSIS       When ordered only abnormal lab results are displayed. All other labs were within normal range or not returned as of this dictation. EKG:  When ordered, EKG's are interpreted by the Emergency Department Physician in the absence of a cardiologist.  Please see their note for interpretation of EKG. RADIOLOGY:   Non-plain film images such as CT, Ultrasound and MRI are read by the radiologist. Plain radiographic images are visualized and preliminarily interpreted by the ED Provider with the below findings:        Interpretation per the Radiologist below, if available at the time of this note:    XR CHEST (2 VW)   Final Result   No significant findings in the chest.           No results found. PROCEDURES   Unless otherwise noted below, none     Procedures    CRITICAL CARE TIME       CONSULTS:  None      EMERGENCY DEPARTMENT COURSE and DIFFERENTIAL DIAGNOSIS/MDM:   Vitals:    Vitals:    12/03/22 1539   BP: 94/68   Pulse: 94   Resp: 14   Temp: 98.3 °F (36.8 °C)   TempSrc: Oral   SpO2: 95%   Weight: 185 lb (83.9 kg)   Height: 5' 10\" (1.778 m)       Patient was given the following medications:  Medications   dexamethasone (DECADRON) tablet 12 mg (12 mg Oral Given 12/3/22 1701)         Is this patient to be included in the SEP-1 Core Measure due to severe sepsis or septic shock? No   Exclusion criteria - the patient is NOT to be included for SEP-1 Core Measure due to:  Viral etiology found or highly suspected (including COVID-19) without concomitant bacterial infection    Patient presented with cough, increased wheezing, general fatigue. This is been ongoing starting 4 days ago. Had some mild wheezing on exam more with forced expiration. Was given some steroids here which she states has been helping. He was positive for influenza. Remainder work-up was unremarkable. He has enough of his inhalers at home. Will be given another dose of steroids tomorrow. Out of the window for Tamiflu treatment. Low suspicion for bacterial pneumonia or any further etiology on top of this. He will follow-up as an outpatient return here for any worsening of symptoms or problems at home. He is nontoxic in appearance. FINAL IMPRESSION      1.  Acute upper respiratory infection    2.  Influenza A          DISPOSITION/PLAN   DISPOSITION Decision To Discharge 12/03/2022 05:59:19 PM      PATIENT REFERRED TO:  Donta Ho MD  [address] [place] [address]  [phone]    Schedule an appointment as soon as possible for a visit in 3 days  For re-check    [facility] Emergency Department  [address] [facility]  [phone]    As needed    DISCHARGE MEDICATIONS:  New Prescriptions    DEXAMETHASONE (DECADRON) 6 MG TABLET    Take 2 tablets by mouth once for 1 dose       DISCONTINUED MEDICATIONS:  Discontinued Medications    No medications on file              (Please note that portions of this note were completed with a voice recognition program.  Efforts were made to edit the dictations but occasionally words are mis-transcribed.)    Irma Ballesteros PA-C (electronically signed)           Irma Ballesteros PA-C  12/03/22 0904

## 2023-01-04 ENCOUNTER — HOSPITAL ENCOUNTER (EMERGENCY)
Age: 48
Discharge: HOME | End: 2023-01-04
Payer: COMMERCIAL

## 2023-01-04 VITALS
OXYGEN SATURATION: 97 % | RESPIRATION RATE: 20 BRPM | DIASTOLIC BLOOD PRESSURE: 90 MMHG | TEMPERATURE: 99.1 F | HEART RATE: 98 BPM | SYSTOLIC BLOOD PRESSURE: 128 MMHG | HEIGHT: 70 IN | WEIGHT: 185 LBS | BODY MASS INDEX: 26.48 KG/M2

## 2023-01-04 DIAGNOSIS — K05.00 ACUTE GINGIVITIS: Primary | ICD-10-CM

## 2023-01-04 DIAGNOSIS — J06.9 ACUTE UPPER RESPIRATORY INFECTION: ICD-10-CM

## 2023-01-04 LAB
RAPID INFLUENZA  B AGN: NEGATIVE
RAPID INFLUENZA A AGN: NEGATIVE
SARS-COV-2, NAAT: NOT DETECTED

## 2023-01-04 PROCEDURE — 87635 SARS-COV-2 COVID-19 AMP PRB: CPT

## 2023-01-04 PROCEDURE — 6360000002 HC RX W HCPCS: Performed by: PHYSICIAN ASSISTANT

## 2023-01-04 PROCEDURE — 99284 EMERGENCY DEPT VISIT MOD MDM: CPT

## 2023-01-04 PROCEDURE — 6370000000 HC RX 637 (ALT 250 FOR IP): Performed by: PHYSICIAN ASSISTANT

## 2023-01-04 PROCEDURE — 96372 THER/PROPH/DIAG INJ SC/IM: CPT

## 2023-01-04 PROCEDURE — 87804 INFLUENZA ASSAY W/OPTIC: CPT

## 2023-01-04 RX ORDER — CLINDAMYCIN HYDROCHLORIDE 300 MG/1
300 CAPSULE ORAL 4 TIMES DAILY
Qty: 40 CAPSULE | Refills: 0 | Status: SHIPPED | OUTPATIENT
Start: 2023-01-04 | End: 2023-01-14

## 2023-01-04 RX ORDER — LIDOCAINE HYDROCHLORIDE 20 MG/ML
15 SOLUTION OROPHARYNGEAL ONCE
Status: COMPLETED | OUTPATIENT
Start: 2023-01-04 | End: 2023-01-04

## 2023-01-04 RX ORDER — KETOROLAC TROMETHAMINE 30 MG/ML
30 INJECTION, SOLUTION INTRAMUSCULAR; INTRAVENOUS ONCE
Status: COMPLETED | OUTPATIENT
Start: 2023-01-04 | End: 2023-01-04

## 2023-01-04 RX ADMIN — KETOROLAC TROMETHAMINE 30 MG: 30 INJECTION, SOLUTION INTRAMUSCULAR; INTRAVENOUS at 09:02

## 2023-01-04 RX ADMIN — LIDOCAINE HYDROCHLORIDE 15 ML: 20 SOLUTION ORAL at 09:02

## 2023-01-04 ASSESSMENT — ENCOUNTER SYMPTOMS
NAUSEA: 0
EYES NEGATIVE: 1
CONSTIPATION: 0
VOICE CHANGE: 0
DIARRHEA: 0
VOMITING: 0
COUGH: 1
SHORTNESS OF BREATH: 0
ABDOMINAL PAIN: 0
TROUBLE SWALLOWING: 0
SORE THROAT: 0
COLOR CHANGE: 0
FACIAL SWELLING: 0
STRIDOR: 0
WHEEZING: 0

## 2023-01-04 ASSESSMENT — PAIN - FUNCTIONAL ASSESSMENT: PAIN_FUNCTIONAL_ASSESSMENT: 0-10

## 2023-01-04 ASSESSMENT — PAIN SCALES - GENERAL
PAINLEVEL_OUTOF10: 10
PAINLEVEL_OUTOF10: 10

## 2023-01-04 ASSESSMENT — PAIN DESCRIPTION - LOCATION: LOCATION: MOUTH;HEAD

## 2023-01-04 NOTE — DISCHARGE INSTRUCTIONS
Toothache    Toothaches are generally caused by tooth decay. If you have severe pain or swelling around a tooth, you may have a deep tooth infection. Tooth decay and infections require evaluation and treatment by a dentist or an oral surgeon. The emergency department will provide you with the best possible care available for your dental problem. Unfortunately, there is not a dentist or dental clinic available in the department. Routine dental care, such as fillings, tooth extractions, or carmen canals are not available in the emergency department. However, we are avaialbe for emergencies including abscesses, fractures of the jaw and other oral trauma such as a tooth that is knocked out. Any other dental problem is best treated by a dentist.  Please see the list of dental clinics in the area if you do not currently have a dentist.      Treatment That Can Be Provided for Toothache in the Emergency Department    If you have a severe toothache, medication may be prescribed for you until you are able to see a dentist.  If you are given an antibiotic, take it as prescribed and continue to take it until gone. Even if you start to feel better, your toothache will need to be treated by a dentist or an oral surgeon. Pain medication may be prescribed for you. As is the policy of the [facility], the emergency department uses nonsteroidal anti-inflammatory medication (NSAIDs) as the primary medication for pain. These may include ibuprofen (Motrin®) or naproxyn sodium (Naprosyn®). Patients who are unable to take nonsteroidal anti-inflammatory medications will generally be advised to take acetaminophen (Tylenol®) for dental pain. As is the policy of the [address] [place] [address], the hospitals emergency department policy strongly discourages the use of the narcotic medications. (Tylenol #3®, Vicodin®, etc) are restricted by specific, strict guidelines.     If you have any questions concerning these instructions, call the emergency department at [facility] @ [phone]. Consult your care giver regarding these instructions and seek your physicians advice regarding medical care. Dental Referrals  Following is a list of local clinics that treat dental problems. Many also have specific emergency hours. For an appointment or for hours of operation, contact the clinic. [address]  [phone]     The [facility]  [address]    [facility] (referral agency)  [address] [facility], [address] [facility] [address]  [phone] or [phone]  (Application Process, Must Qualify)     [facility]  [address] [facility] [address]  [phone]  ([facility] Medicaid and Insurance with Payment plans for Self-Insured)     Clinics:    [address] [facility]   [address]  [place], [address]  [address] [facility]  [address]  [place] [address]. [place], [address] [facility]  6 [facility], [address]:    [facility] Syed Lozano  Appointment only  [address]. [facility], [address] [facility]  Yusra-Belarusian speaking  [address]  [address] [place] [address], [address] [facility]  [address] [facility]  [address] [facility]  [address] Tran [facility] 119  [facility] [address]  [address]  [place]  [facility] 58  [facility] 167.   [place], [address]  [address]  [facility]  [address] [facility] [address] [facility] [place] [address]  [address]  [address] [facility], [address] [place],[address]     [facility]   [address]  [place], [address]  [phone] ext.  [address]  10 [facility], [address] [facility]  [phone] or [phone]  (Must qualify)

## 2023-01-04 NOTE — ED PROVIDER NOTES
[facility]        Pt Name: Neal Jones  MRN: [id number]  [place] 1975  Date of evaluation: 1/4/2023  Provider: Emma Kerr PA-C  PCP: Varghese Lyn MD  Note Started: 9:03 AM EST 1/4/23      BABS. I have evaluated this patient. My supervising physician was available for consultation. CHIEF COMPLAINT       Chief Complaint   Patient presents with    Dental Pain     For a couple weeks, was at urgent care yesterday and given antibiotics. Pain worse. HISTORY OF PRESENT ILLNESS: 1 or more Elements     History From: patient  Limitations to history : None    Neal Jones is a 56 y.o. male who presents to the emergency department complaining of bilateral lower dental pain for several days. Yesterday, he went to urgent care and was prescribed Levaquin and Flagyl. He did not take any doses today but did take it yesterday. He reportedly woke up this morning with a fever of 102 Fahrenheit. He took extra strength Tylenol. He does have a pain pump for chronic back pain. This contains fentanyl powder. He also takes Lyrica and Klonopin. He sees a pain specialist and has an appointment with his pain specialist today at 11:30 AM.  He woke up this morning with congestion and cough. He is concerned that he has another infection on top of his dental infection. Nursing Notes were all reviewed and agreed with or any disagreements were addressed in the HPI. REVIEW OF SYSTEMS :      Review of Systems   Constitutional:  Positive for chills and fever. HENT:  Positive for congestion and dental problem. Negative for drooling, ear discharge, ear pain, facial swelling, sore throat, tinnitus, trouble swallowing and voice change. Eyes: Negative. Respiratory:  Positive for cough. Negative for shortness of breath, wheezing and stridor. Cardiovascular:  Negative for chest pain, palpitations and leg swelling. Gastrointestinal:  Negative for abdominal pain, constipation, diarrhea, nausea and vomiting. Genitourinary: Negative. Musculoskeletal:  Negative for neck pain and neck stiffness. Back pain: chronic and unchanged from  baseline. Skin:  Negative for color change, pallor, rash and wound. Neurological: Negative. Psychiatric/Behavioral:  Negative for confusion. All other systems reviewed and are negative. Positives and Pertinent negatives as per HPI. SURGICAL HISTORY     Past Surgical History:   Procedure Laterality Date    BACK SURGERY      MULTPLE    BACK SURGERY      PAIN PUMP    ELBOW SURGERY Left     ULNAR NERVE TRANSPOSITION    ENDOSCOPY, COLON, DIAGNOSTIC      EYE SURGERY      LASIK    SPINAL CORD STIMULATOR SURGERY      REMOVED    SPINE SURGERY      HARDWARE AND SCAR TISSUE REMOVAL    STIMULATOR SURGERY N/A 6/30/2020    REPLACEMENT INTRATHECAL PUMP         FLOWONIX performed by Jim Donis MD at [address]       Previous Medications    CLONAZEPAM (KLONOPIN) 1 MG TABLET    Take 2 mg by mouth 2 times daily. OXYCODONE (OXY-IR) 15 MG IMMEDIATE RELEASE TABLET    Take 15 mg by mouth every 8 hours. PREGABALIN (LYRICA) 150 MG CAPSULE    Take 150 mg by mouth 3 times daily. TIZANIDINE (ZANAFLEX) 4 MG TABLET    Take 4 mg by mouth 4 times daily TAKES 8 MG TWICE DAILY       ALLERGIES     Augmentin [amoxicillin-pot clavulanate]    FAMILYHISTORY     No family history on file.      SOCIAL HISTORY       Social History     Tobacco Use    Smoking status: Some Days     Types: Cigars    Smokeless tobacco: Current     Types: Snuff   Vaping Use    Vaping Use: Former   Substance Use Topics    Alcohol use: Never    Drug use: Never     Types: Marijuana Jitendra Pena)     Comment: MEDICAL TRIAL       SCREENINGS        [place] Coma Scale  Eye Opening: Spontaneous  Best Verbal Response: Oriented  Best Motor Response: Obeys commands  [place] Coma Scale Score: 15                WA Assessment  BP: (!) 128/90  Heart Rate: 98           PHYSICAL EXAM  1 or more Elements     ED Triage Vitals [01/04/23 0836]   BP Temp Temp Source Heart Rate Resp SpO2 Height Weight   (!) 128/90 99.1 °F (37.3 °C) Oral 98 20 97 % 5' 10\" (1.778 m) 185 lb (83.9 kg)       Physical Exam  Vitals and nursing note reviewed. Constitutional:       Appearance: Normal appearance. He is well-developed. He is not toxic-appearing or diaphoretic. HENT:      Head: Normocephalic and atraumatic. Jaw: There is normal jaw occlusion. No trismus, tenderness, swelling, pain on movement or malocclusion. Salivary Glands: Right salivary gland is not diffusely enlarged or tender. Left salivary gland is not diffusely enlarged or tender. Right Ear: Hearing, tympanic membrane, ear canal and external ear normal.      Left Ear: Hearing, tympanic membrane, ear canal and external ear normal.      Nose: Congestion present. Right Sinus: No maxillary sinus tenderness or frontal sinus tenderness. Left Sinus: No maxillary sinus tenderness or frontal sinus tenderness. Mouth/Throat:      Lips: Pink. No lesions. Mouth: Mucous membranes are moist. No injury, lacerations, oral lesions or angioedema. Dentition: Dental tenderness, gingival swelling and dental caries present. No dental abscesses. Tongue: No lesions. Tongue does not deviate from midline. Palate: No mass and lesions. Pharynx: Oropharynx is clear. Uvula midline. No uvula swelling. Tonsils: No tonsillar exudate or tonsillar abscesses. Comments: Multiple dental caries, there is periodontal gingival tenderness and edema to the bilateral lower molars. No fluctuant abscess or crepitus. No sublingual tenderness or swelling. Eyes:      General: No scleral icterus. Right eye: No discharge. Left eye: No discharge. Extraocular Movements: Extraocular movements intact. Conjunctiva/sclera: Conjunctivae normal.      Pupils: Pupils are equal, round, and reactive to light. Neck:      Trachea: Trachea and phonation normal.      Meningeal: Brudzinski's sign and Kernig's sign absent. Cardiovascular:      Rate and Rhythm: Normal rate. Pulmonary:      Effort: Pulmonary effort is normal.      Breath sounds: Normal breath sounds. Abdominal:      General: Bowel sounds are normal.      Palpations: Abdomen is soft. Tenderness: There is no abdominal tenderness. There is no right CVA tenderness or left CVA tenderness. Musculoskeletal:         General: Normal range of motion. Cervical back: Full passive range of motion without pain, normal range of motion and neck supple. Lymphadenopathy:      Cervical: No cervical adenopathy. Skin:     General: Skin is warm and dry. Coloration: Skin is not jaundiced or pale. Findings: No bruising, erythema, lesion or rash. Neurological:      General: No focal deficit present. Mental Status: He is alert and oriented to person, place, and time. Psychiatric:         Behavior: Behavior normal.           DIAGNOSTIC RESULTS   LABS:    Labs Reviewed   COVID-19, RAPID   RAPID INFLUENZA A/B ANTIGENS       When ordered only abnormal lab results are displayed. All other labs were within normal range or not returned as of this dictation. EKG: When ordered, EKG's are interpreted by the Emergency Department Physician in the absence of a cardiologist.  Please see their note for interpretation of EKG. RADIOLOGY:   Non-plain film images such as CT, Ultrasound and MRI are read by the radiologist. Plain radiographic images are visualized and preliminarily interpreted by the ED Provider with the below findings:        Interpretation per the Radiologist below, if available at the time of this note:    No orders to display     No results found. No results found.     PROCEDURES   Unless otherwise noted below, none Procedures    CRITICAL CARE TIME (.cctime)   N/a    PAST MEDICAL HISTORY      has a past medical history of Disc disorder (1996). EMERGENCY DEPARTMENT COURSE and DIFFERENTIAL DIAGNOSIS/MDM:   Vitals:    Vitals:    01/04/23 0836   BP: (!) 128/90   Pulse: 98   Resp: 20   Temp: 99.1 °F (37.3 °C)   TempSrc: Oral   SpO2: 97%   Weight: 185 lb (83.9 kg)   Height: 5' 10\" (1.778 m)       Patient was given the following medications:  Medications   ketorolac (TORADOL) injection 30 mg (30 mg IntraMUSCular Given 1/4/23 0902)   lidocaine viscous hcl (XYLOCAINE) 2 % solution 15 mL (15 mLs Mouth/Throat Given 1/4/23 0902)             Is this patient to be included in the SEP-1 Core Measure due to severe sepsis or septic shock? No   Exclusion criteria - the patient is NOT to be included for SEP-1 Core Measure due to:  2+ SIRS criteria are not met    Chronic Conditions affecting care: chronic pain    has a past medical history of Disc disorder (1996). CONSULTS: (Who and What was discussed)  None      Social Determinants : None    Records Reviewed (Source): outpatient records and PDMP    CC/HPI Summary, DDx, ED Course, and Reassessment: This patient presents to the emergency department with chief complaint of bilateral lower dental pain, fever, cough. Meningeal signs are negative. He is neurologically intact. Airway is patent. Does have findings consistent with acute gingivitis. Patient is advised to discontinue Levaquin and Flagyl. Advised to take clindamycin instead. He has amoxicillin allergy. COVID and flu swabs are negative. Lungs are clear without wheezing, rhonchi, rales or decreased breath sounds. Abdomen is soft and nontender. Patient has a pain pump to receive chronic pain medicine for chronic pain. He has extra strength Tylenol and ibuprofen at home. He is not requesting any more of these medications. I will send patient home with miracle mouthwash and prescription for clindamycin.   Advised to follow-up with PCP, pain specialist, and dentist. He denies acute on chronic back pain. It is at baseline without recent instrumentation. My suspicion is low for ACS, PE, myocarditis, pericarditis, endocarditis, acute pulmonary edema, pleural effusion, pericardial effusion, cardiac tamponade, cardiomyopathy, CHF exacerbation, thoracic aortic dissection, esophageal rupture, other life-threatening arrhythmia, hemothorax, pulmonary contusion, subcutaneous emphysema, flail chest, pneumo mediastinum, rib fracture, pneumonia, pneumothorax, ARDS, carotid dissection, sinus abscess, acute fracture, acute CVA, ICH, SAH, TIA, meningitis, encephalitis, pseudotumor cerebri, temporal arteritis, sentinel bleed from ruptured aneurysm, hypertensive urgency or emergency, subdural hematoma, epidural hematoma, cerebellar compromise, posterior stroke, bells palsy, TMJ syndrome, trigeminal neuralgia, dental abscess, Jovany angina, otitis, acute strep pharyngitis, peritonsillar or tonsillar abscess, retropharyngeal abscess, bacterial tracheitis, epiglottitis, meningitis, encephalitis, foreign body, angioedema, anaphylaxis, mononucleosis, mumps, lymphoma, leukemia, asthma exacerbation, osteomyelitis, mastoiditis, sepsis, DKA,  sialadenitis, parotiditis, trench mouth, shingles, acute spine fracture or dislocation, epidural abscess or hematoma, discitis, meningitis, encephalitis, transverse myelitis, cauda quina,  pyelonephritis, perinephric abscess, urosepsis, kidney stone, AAA, dissection, shingles, or other concerning pathology. Disposition Considerations (tests considered but not done, Admit vs D/C, Shared Decision Making, Pt Expectation of Test or Tx.): This patient presents to the emergency department with primary complaint of bilateral lower dental pain and cough with fever. Clinically has findings consistent with gingivitis. Denies any preceding injury. Lung sounds are clear.   I do not feel emergent imaging is warranted at this time and patient agrees. He does get some relief with Toradol injection and topical lidocaine viscus solution swish and spit. Advised to keep his appointment with pain specialist 11:30 AM.  He was given return precautions. Clinical presentation and vitals do not suggest acute sepsis. Patient is stable for outpatient management with specialist including PCP, pain specialist and dentist.      I am the Primary Clinician of Record. FINAL IMPRESSION      1. Acute gingivitis    2.  Acute upper respiratory infection          DISPOSITION/PLAN     DISPOSITION Decision To Discharge 01/04/2023 09:36:04 AM      PATIENT REFERRED TO:  Cedric Parikh MD  [address] [address]  [phone]    In 3 days      see your pain specialist as scheduled          [facility]  [phone]    or follow up with your dentist    DISCHARGE MEDICATIONS:  New Prescriptions    CLINDAMYCIN (CLEOCIN) 300 MG CAPSULE    Take 1 capsule by mouth 4 times daily for 10 days    MAGIC MOUTHWASH (MIRACLE MOUTHWASH)    Swish and spit 5 mLs 4 times daily as needed for Irritation       DISCONTINUED MEDICATIONS:  Discontinued Medications    DIPHENHYDRAMINE (BENADRYL) 25 MG TABLET    Take 25 mg by mouth every 6 hours as needed for Itching              (Please note that portions of this note were completed with a voice recognition program.  Efforts were made to edit the dictations but occasionally words are mis-transcribed.)    Susanna Arzate PA-C (electronically signed)       Susanna Arzate PA-C  01/04/23 6436

## 2024-06-19 ENCOUNTER — HOSPITAL ENCOUNTER (OUTPATIENT)
Dept: VASCULAR LAB | Age: 49
Discharge: HOME | End: 2024-06-21
Payer: COMMERCIAL

## 2024-06-19 DIAGNOSIS — M79.605 LEFT LEG PAIN: ICD-10-CM

## 2024-06-19 PROCEDURE — 93971 EXTREMITY STUDY: CPT

## (undated) DEVICE — SYRINGE MED 10ML TRNSLUC BRL PLUNG BLK MRK POLYPR CTRL

## (undated) DEVICE — CODMAN® SURGICAL PATTIES 1/2" X 2" (1.27CM X 5.08CM): Brand: CODMAN®

## (undated) DEVICE — BINDER ABD UNISX 9IN 45IN SM AND M UNIV

## (undated) DEVICE — SUTURE MCRYL + SZ 4-0 L18IN ABSRB UD L19MM PS-2 3/8 CIR MCP496G

## (undated) DEVICE — GLOVE ORANGE PI 8   MSG9080

## (undated) DEVICE — GLOVE ORANGE PI 8 1/2   MSG9085

## (undated) DEVICE — BLADE ES ELASTOMERIC COAT INSUL DURABLE BEND UPTO 90DEG

## (undated) DEVICE — GOWN,AURORA,NONREINF,RAGLAN,XXL,STERILE: Brand: MEDLINE

## (undated) DEVICE — ADHESIVE SKIN CLSR 0.7ML TOP DERMBND ADV

## (undated) DEVICE — THE PATIENT THERAPY CONTROLLER (PTC) IS A HANDHELD TOUCHSCREEN DEVICE, WHICH ALLOWS A PATIENT TO INITIATE A PRE-CONFIGURED SUPPLEMENTAL BOLUS OF MEDICATION FROM THEIR IMPLANTED PROGRAMMABLE PUMP. THE PATIENT REQUESTS A BOLUS BY PRESSING THE RX BUTTON ON THE PTC AND THEN PLACING IT OVER THEIR IMPLANTED PUMP.  THE PATIENT THERAPY CONTROLLER COMMUNICATES WITH THE IMPLANTED PUMP INITIATING THE SUPPLEMENTAL BOLUS DELIVERY.  THE SETTINGS FOR THE BOLUS DELIVERY ARE PROGRAMMED INTO THE PATIENT THERAPY CONTROLLER BY A HEALTHCARE CERTIFIED PROFESSIONAL (HCP) USING THE CONFIGURATION DEVICE.: Brand: PROMETRA PATIENT THERAPY CONTROLLER

## (undated) DEVICE — FLOSEAL HEMOSTATIC MATRIX, 5 ML: Brand: FLOSEAL

## (undated) DEVICE — TOOL 14MH30 LEGEND 14CM 3MM: Brand: MIDAS REX ™

## (undated) DEVICE — TUBING SUCT 12FR MAL ALUM SHFT FN CAP VENT UNIV CONN W/ OBT

## (undated) DEVICE — SUTURE VCRL + SZ 0 L18IN ABSRB UD L36MM CT-1 1/2 CIR VCP840D

## (undated) DEVICE — SUTURE VCRL + SZ 3-0 L18IN ABSRB UD SH 1/2 CIR TAPERCUT NDL VCP864D

## (undated) DEVICE — Device

## (undated) DEVICE — SUTURE ETHBND EXCEL SZ 0 L18IN NONABSORBABLE GRN L22MM MO-7 CX41D

## (undated) DEVICE — SOLUTION IV IRRIG POUR BRL 0.9% SODIUM CHL 2F7124